# Patient Record
Sex: MALE | URBAN - METROPOLITAN AREA
[De-identification: names, ages, dates, MRNs, and addresses within clinical notes are randomized per-mention and may not be internally consistent; named-entity substitution may affect disease eponyms.]

---

## 2018-11-01 ENCOUNTER — APPOINTMENT (RX ONLY)
Dept: URBAN - METROPOLITAN AREA CLINIC 152 | Facility: CLINIC | Age: 53
Setting detail: DERMATOLOGY
End: 2018-11-01

## 2018-11-01 DIAGNOSIS — L30.0 NUMMULAR DERMATITIS: ICD-10-CM

## 2018-11-01 DIAGNOSIS — L91.0 HYPERTROPHIC SCAR: ICD-10-CM

## 2018-11-01 PROBLEM — E78.5 HYPERLIPIDEMIA, UNSPECIFIED: Status: ACTIVE | Noted: 2018-11-01

## 2018-11-01 PROBLEM — I10 ESSENTIAL (PRIMARY) HYPERTENSION: Status: ACTIVE | Noted: 2018-11-01

## 2018-11-01 PROCEDURE — ? COUNSELING

## 2018-11-01 PROCEDURE — ? PRESCRIPTION

## 2018-11-01 PROCEDURE — 99203 OFFICE O/P NEW LOW 30 MIN: CPT

## 2018-11-01 RX ORDER — FLUOCINONIDE 0.5 MG/G
CREAM TOPICAL BID
Qty: 1 | Refills: 1 | Status: ERX | COMMUNITY
Start: 2018-11-01

## 2018-11-01 RX ADMIN — FLUOCINONIDE: 0.5 CREAM TOPICAL at 13:55

## 2018-11-01 ASSESSMENT — SEVERITY ASSESSMENT: SEVERITY: ALMOST CLEAR

## 2018-11-01 NOTE — HPI: RASH
What Type Of Note Output Would You Prefer (Optional)?: Standard Output
How Severe Is Your Rash?: mild
Is This A New Presentation, Or A Follow-Up?: Rash
Additional History: He moisturizes with CeraVe cream and Aquaphor ointment.

## 2025-05-14 ENCOUNTER — SURGERY/PROCEDURE (OUTPATIENT)
Dept: URBAN - METROPOLITAN AREA MEDICAL CENTER 3 | Facility: MEDICAL CENTER | Age: 60
End: 2025-05-14

## 2025-05-14 DIAGNOSIS — H43.12: ICD-10-CM

## 2025-05-14 DIAGNOSIS — H33.302: ICD-10-CM

## 2025-05-14 DIAGNOSIS — H35.09: ICD-10-CM

## 2025-05-14 PROCEDURE — 67039 LASER TREATMENT OF RETINA: CPT | Mod: 80

## 2025-05-15 ENCOUNTER — 1 DAY POST-OP (OUTPATIENT)
Dept: URBAN - METROPOLITAN AREA CLINIC 74 | Facility: CLINIC | Age: 60
End: 2025-05-15

## 2025-05-15 DIAGNOSIS — Z98.890: ICD-10-CM

## 2025-05-15 DIAGNOSIS — H43.12: ICD-10-CM

## 2025-05-15 PROCEDURE — 99024 POSTOP FOLLOW-UP VISIT: CPT

## 2025-05-15 PROCEDURE — 92202 OPSCPY EXTND ON/MAC DRAW: CPT | Mod: NC

## 2025-05-15 ASSESSMENT — VISUAL ACUITY: OS_SC: 20/100

## 2025-05-15 ASSESSMENT — TONOMETRY: OS_IOP_MMHG: 19

## 2025-05-22 ENCOUNTER — 1 WEEK POST-OP (OUTPATIENT)
Dept: URBAN - METROPOLITAN AREA CLINIC 18 | Facility: CLINIC | Age: 60
End: 2025-05-22

## 2025-05-22 DIAGNOSIS — Z98.890: ICD-10-CM

## 2025-05-22 DIAGNOSIS — H33.302: ICD-10-CM

## 2025-05-22 DIAGNOSIS — H43.12: ICD-10-CM

## 2025-05-22 DIAGNOSIS — H52.13: ICD-10-CM

## 2025-05-22 PROCEDURE — 92201 OPSCPY EXTND RTA DRAW UNI/BI: CPT | Mod: NC

## 2025-05-22 PROCEDURE — 99024 POSTOP FOLLOW-UP VISIT: CPT

## 2025-05-22 ASSESSMENT — VISUAL ACUITY: OS_SC: 20/25+2

## 2025-05-22 ASSESSMENT — TONOMETRY: OS_IOP_MMHG: 11

## 2025-06-20 ENCOUNTER — POST-OP CHECK (OUTPATIENT)
Dept: URBAN - METROPOLITAN AREA CLINIC 18 | Facility: CLINIC | Age: 60
End: 2025-06-20

## 2025-06-20 DIAGNOSIS — H33.302: ICD-10-CM

## 2025-06-20 DIAGNOSIS — H43.12: ICD-10-CM

## 2025-06-20 DIAGNOSIS — Z98.890: ICD-10-CM

## 2025-06-20 DIAGNOSIS — H52.13: ICD-10-CM

## 2025-06-20 PROCEDURE — 99024 POSTOP FOLLOW-UP VISIT: CPT

## 2025-06-20 PROCEDURE — 92134 CPTRZ OPH DX IMG PST SGM RTA: CPT | Mod: NC

## 2025-06-20 ASSESSMENT — VISUAL ACUITY
OD_SC: 20/25
OS_SC: 20/20

## 2025-06-20 ASSESSMENT — TONOMETRY
OS_IOP_MMHG: 17
OD_IOP_MMHG: 14

## 2025-08-01 ENCOUNTER — POST-OP CHECK (OUTPATIENT)
Dept: URBAN - METROPOLITAN AREA CLINIC 18 | Facility: CLINIC | Age: 60
End: 2025-08-01

## 2025-08-01 DIAGNOSIS — H43.12: ICD-10-CM

## 2025-08-01 DIAGNOSIS — Z98.890: ICD-10-CM

## 2025-08-01 DIAGNOSIS — H33.302: ICD-10-CM

## 2025-08-01 DIAGNOSIS — H52.13: ICD-10-CM

## 2025-08-01 PROCEDURE — 99024 POSTOP FOLLOW-UP VISIT: CPT

## 2025-08-01 PROCEDURE — 92134 CPTRZ OPH DX IMG PST SGM RTA: CPT | Mod: NC

## 2025-08-01 PROCEDURE — 92201 OPSCPY EXTND RTA DRAW UNI/BI: CPT | Mod: NC

## 2025-08-01 ASSESSMENT — TONOMETRY
OD_IOP_MMHG: 14
OS_IOP_MMHG: 10

## 2025-08-01 ASSESSMENT — VISUAL ACUITY
OS_SC: 20/20-1
OD_SC: 20/20

## (undated) RX ORDER — OFLOXACIN 3 MG/ML
1 SOLUTION OPHTHALMIC
Start: 2025-05-15